# Patient Record
Sex: MALE | Race: WHITE
[De-identification: names, ages, dates, MRNs, and addresses within clinical notes are randomized per-mention and may not be internally consistent; named-entity substitution may affect disease eponyms.]

---

## 2020-08-09 ENCOUNTER — HOSPITAL ENCOUNTER (EMERGENCY)
Dept: HOSPITAL 47 - EC | Age: 60
Discharge: HOME | End: 2020-08-09
Payer: COMMERCIAL

## 2020-08-09 VITALS
TEMPERATURE: 97.6 F | HEART RATE: 92 BPM | RESPIRATION RATE: 17 BRPM | DIASTOLIC BLOOD PRESSURE: 100 MMHG | SYSTOLIC BLOOD PRESSURE: 148 MMHG

## 2020-08-09 DIAGNOSIS — Z23: ICD-10-CM

## 2020-08-09 DIAGNOSIS — S51.812A: Primary | ICD-10-CM

## 2020-08-09 DIAGNOSIS — F17.200: ICD-10-CM
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
## 2020-08-09 PROCEDURE — 90715 TDAP VACCINE 7 YRS/> IM: CPT

## 2020-08-09 PROCEDURE — 90471 IMMUNIZATION ADMIN: CPT

## 2020-08-09 PROCEDURE — 99283 EMERGENCY DEPT VISIT LOW MDM: CPT

## 2020-08-09 PROCEDURE — 12004 RPR S/N/AX/GEN/TRK7.6-12.5CM: CPT

## 2020-08-09 NOTE — ED
General Adult HPI





<Tri Aguilera - Last Filed: 08/09/20 20:02>





- General


Source: patient


Mode of arrival: wheelchair


Limitations: no limitations





<SebastienChan JIA - Last Filed: 08/09/20 20:15>





- General


Chief complaint: Wound/Laceration


Stated complaint: lt arm lac


Time Seen by Provider: 08/09/20 18:12





- History of Present Illness


Initial comments: 


Dictation was produced using dragon dictation software. please excuse any 

grammatical, word or spelling errors. 





This patient was cared for during a federal and state declared state of 

emergency secondary to Covid 19





Chief Complaint: 60-year-old male presents with left forearm laceration.





History of Present Illness: 60-year-old male he was working in with his family 

member building a deck.  States that the ladder fell.  He works at there is some

nails on the ladder and fell onto his arm causing a laceration to the medial fo

rearm.  Patient's last tetanus shot was proximal to 5 years ago.  Denies any 

medical history.  No history of diabetes.  No numbness and paresthesias to the 

left hand or left upper extremity.








The ROS documented in this emergency department record has been reviewed and 

confirmed by me.  Those systems with pertinent positive or negative responses 

have been documented in the HPI.  All other systems are other negative and/or 

noncontributory.








PHYSICAL EXAM:


General Impression: Alert and oriented x3, not in acute distress


HEENT: Normocephalic atraumatic, extra-ocular movements intact, pupils equal and

reactive to light bilaterally, mucous membranes moist.


Cardiovascular: Heart regular rate and rhythm


Chest: Able to complete full sentences, no retractions, no tachypnea


Abdomen: abdomen soft, non-tender, non-distended, no organomegaly


Musculoskeletal: Pulses present and equal in all extremities, no peripheral 

edema


Motor:  no focal deficits noted


Neurological: CN II-XII grossly intact, no focal motor or sensory deficits noted


Skin: Intact with no visualized rashes


Left forearm: Avulsion laceration to the left medial forearm measuring 

approximately 12 cm.  There is exposed fat tissue but no exposure of muscle 

tissue.


Psych: Normal affect and mood





ED course: 59 yo male presents with a laceration to the left forearm.  Upon 

arrival are within acceptable limits.  Patient's tetanus will be updated.  Wound

is irrigated and sutures were placed by Tri Aguilera, physician assistant.  

X-rays are unremarkable.  Physical exam does not show any neuro or motor 

deficits to the left hand.  Suture repaired.  Patient given prophylactic 

antibiotics.  Told to have sutures removed in 10-14 days.











 (Chan Crowe)





- Related Data


                                  Previous Rx's











 Medication  Instructions  Recorded


 


Cephalexin [Keflex] 500 mg PO Q6HR 5 Days #20 cap 08/09/20











                                    Allergies











Allergy/AdvReac Type Severity Reaction Status Date / Time


 


No Known Allergies Allergy   Verified 08/09/20 18:08














Review of Systems


ROS Other: All systems not noted in ROS Statement are negative.





<Tri Aguilera - Last Filed: 08/09/20 20:02>


ROS Other: All systems not noted in ROS Statement are negative.





<Chan Crowe - Last Filed: 08/09/20 20:15>


ROS Statement: 


Those systems with pertinent positive or pertinent negative responses have been 

documented in the HPI.








Past Medical History


Past Medical History: No Reported History


History of Any Multi-Drug Resistant Organisms: None Reported


Past Surgical History: Orthopedic Surgery


Additional Past Surgical History / Comment(s): stomach ulcer repair


Past Psychological History: No Psychological Hx Reported


Smoking Status: Current every day smoker


Past Alcohol Use History: Daily


Past Drug Use History: None Reported





<Chan Crowe - Last Filed: 08/09/20 20:15>





General Exam


Limitations: no limitations





<Chan Crowe - Last Filed: 08/09/20 20:15>





Course


                                   Vital Signs











  08/09/20 08/09/20





  18:04 19:29


 


Temperature 98.3 F 97.9 F


 


Pulse Rate 94 95


 


Respiratory 16 20





Rate  


 


Blood Pressure 115/82 140/83


 


O2 Sat by Pulse 100 98





Oximetry  














Procedures





- Laceration


  ** Laceration #1


Consent Obtained: verbal consent


Indication: laceration


Site: upper extremity (Right forearm)


Size (cm): 12


Description: flap (L-shaped)


Depth: simple, single layer


Anesthetic Used: lidocaine 1%, with epi


Anesthesia Technique: local infiltration


Amount (mls): 8


Pre-repair: irrigated extensively


Type of Sutures: nylon


Size of Sutures: 4-0


Number of Sutures: 16


Technique: simple, interrupted


Patient Tolerated Procedure: well





<Tri Aguilera - Last Filed: 08/09/20 20:02>





Disposition





<AleTri L - Last Filed: 08/09/20 20:02>


Is patient prescribed a controlled substance at d/c from ED?: No


Time of Disposition: 19:01





<Chan Crowe - Last Filed: 08/09/20 20:15>


Clinical Impression: 


 Laceration





Disposition: HOME SELF-CARE


Condition: Good


Instructions (If sedation given, give patient instructions):  Laceration (ED)


Additional Instructions: 


Please have your stitches removed in 10-14 days.  Biaxin prescribed to you for 

infection prophylaxis.  Please seek medical attention if you develop any 

worsening pain or infectious appearing drainage from the wound.


Prescriptions: 


Cephalexin [Keflex] 500 mg PO Q6HR 5 Days #20 cap


Referrals: 


Sharon Virk DO [Primary Care Provider] - 1-2 days

## 2020-08-09 NOTE — XR
EXAMINATION TYPE: XR forearm LT

 

DATE OF EXAM: 8/9/2020

 

COMPARISON: NONE

 

HISTORY: Pain. Laceration.

 

TECHNIQUE: 2 views

 

FINDINGS: There is laceration deformity of the soft tissues at the medial aspect of the mid shaft of 
the ulna. I see no fracture nor dislocation. Radius and ulna appear intact. There is some spurring on
 the olecranon process of the ulna. Carpal bones are intact.

 

IMPRESSION: Soft tissue laceration deformity. No foreign body seen. No fracture.